# Patient Record
Sex: MALE | Race: WHITE | URBAN - METROPOLITAN AREA
[De-identification: names, ages, dates, MRNs, and addresses within clinical notes are randomized per-mention and may not be internally consistent; named-entity substitution may affect disease eponyms.]

---

## 2019-07-01 ENCOUNTER — NEW PATIENT (OUTPATIENT)
Dept: URBAN - METROPOLITAN AREA CLINIC 26 | Facility: CLINIC | Age: 64
End: 2019-07-01
Payer: MEDICARE

## 2019-07-01 DIAGNOSIS — S05.02XA CORNEAL ABRASION OF LEFT EYE, INITIAL ENCOUNTER: Primary | ICD-10-CM

## 2019-07-01 PROCEDURE — 65430 CORNEAL SMEAR: CPT | Performed by: OPTOMETRIST

## 2019-07-01 PROCEDURE — 92002 INTRM OPH EXAM NEW PATIENT: CPT | Performed by: OPTOMETRIST

## 2019-07-01 RX ORDER — METFORMIN HYDROCHLORIDE 1000 MG/1
TABLET, FILM COATED ORAL
Qty: 0 | Refills: 0 | Status: INACTIVE
Start: 2019-07-01 | End: 2019-12-13

## 2019-07-01 ASSESSMENT — INTRAOCULAR PRESSURE
OD: 16
OS: 21

## 2019-07-09 ENCOUNTER — FOLLOW UP ESTABLISHED (OUTPATIENT)
Dept: URBAN - METROPOLITAN AREA CLINIC 26 | Facility: CLINIC | Age: 64
End: 2019-07-09
Payer: MEDICARE

## 2019-07-09 DIAGNOSIS — S05.02XD CORNEAL ABRASION OF LEFT EYE, SUBSEQUENT ENCOUNTER: Primary | ICD-10-CM

## 2019-07-09 PROCEDURE — 99213 OFFICE O/P EST LOW 20 MIN: CPT | Performed by: OPTOMETRIST

## 2019-07-09 ASSESSMENT — INTRAOCULAR PRESSURE
OD: 10
OS: 21

## 2019-08-13 ENCOUNTER — FOLLOW UP ESTABLISHED (OUTPATIENT)
Dept: URBAN - METROPOLITAN AREA CLINIC 26 | Facility: CLINIC | Age: 64
End: 2019-08-13
Payer: MEDICARE

## 2019-08-13 DIAGNOSIS — E11.3292 DIABETES MELLITUS TYPE 2 WITH MILD NON-PROLIFERATI: ICD-10-CM

## 2019-08-13 DIAGNOSIS — H25.811 COMBINED FORMS OF AGE-RELATED CATARACT, RIGHT EYE: Primary | ICD-10-CM

## 2019-08-13 DIAGNOSIS — H02.831 DERMATOCHALASIS OF RIGHT UPPER LID: ICD-10-CM

## 2019-08-13 PROCEDURE — 92134 CPTRZ OPH DX IMG PST SGM RTA: CPT | Performed by: OPTOMETRIST

## 2019-08-13 PROCEDURE — 92250 FUNDUS PHOTOGRAPHY W/I&R: CPT | Performed by: OPTOMETRIST

## 2019-08-13 PROCEDURE — 92014 COMPRE OPH EXAM EST PT 1/>: CPT | Performed by: OPTOMETRIST

## 2019-08-13 ASSESSMENT — INTRAOCULAR PRESSURE
OD: 12
OS: 16

## 2019-08-13 ASSESSMENT — VISUAL ACUITY
OS: 20/CF 2'
OD: 20/30

## 2019-12-13 ENCOUNTER — Encounter (OUTPATIENT)
Dept: URBAN - METROPOLITAN AREA CLINIC 24 | Facility: CLINIC | Age: 64
End: 2019-12-13
Payer: MEDICARE

## 2019-12-13 DIAGNOSIS — Z01.818 ENCOUNTER FOR OTHER PREPROCEDURAL EXAMINATION: Primary | ICD-10-CM

## 2019-12-13 PROCEDURE — 99213 OFFICE O/P EST LOW 20 MIN: CPT | Performed by: PHYSICIAN ASSISTANT

## 2019-12-13 PROCEDURE — 92025 CPTRIZED CORNEAL TOPOGRAPHY: CPT | Performed by: OPHTHALMOLOGY

## 2019-12-13 RX ORDER — ALLOPURINOL 100 MG/1
100 MG TABLET ORAL
Qty: 0 | Refills: 0 | Status: ACTIVE
Start: 2019-12-13

## 2019-12-13 RX ORDER — METFORMIN HYDROCHLORIDE 500 MG/1
500 MG TABLET, FILM COATED ORAL
Qty: 0 | Refills: 0 | Status: ACTIVE
Start: 2019-12-13

## 2019-12-13 RX ORDER — TEMAZEPAM 7.5 MG/1
7.5 MG CAPSULE ORAL
Qty: 0 | Refills: 0 | Status: ACTIVE
Start: 2019-12-13

## 2019-12-16 ENCOUNTER — NEW PATIENT (OUTPATIENT)
Dept: URBAN - METROPOLITAN AREA CLINIC 24 | Facility: CLINIC | Age: 64
End: 2019-12-16
Payer: MEDICARE

## 2019-12-16 DIAGNOSIS — Z79.84 LONG TERM (CURRENT) USE OF ORAL ANTIDIABETIC DRUGS: ICD-10-CM

## 2019-12-16 DIAGNOSIS — E11.9 TYPE 2 DIABETES MELLITUS WITHOUT COMPLICATIONS: ICD-10-CM

## 2019-12-16 PROCEDURE — 76519 ECHO EXAM OF EYE: CPT | Performed by: OPHTHALMOLOGY

## 2019-12-16 PROCEDURE — 92002 INTRM OPH EXAM NEW PATIENT: CPT | Performed by: OPHTHALMOLOGY

## 2019-12-16 RX ORDER — PREDNISOLONE ACETATE 10 MG/ML
1 % SUSPENSION/ DROPS OPHTHALMIC
Qty: 1 | Refills: 1 | Status: INACTIVE
Start: 2019-12-16 | End: 2020-01-31

## 2019-12-16 RX ORDER — OFLOXACIN 3 MG/ML
0.3 % SOLUTION/ DROPS OPHTHALMIC
Qty: 1 | Refills: 0 | Status: INACTIVE
Start: 2019-12-16 | End: 2020-01-31

## 2019-12-16 RX ORDER — DICLOFENAC SODIUM 1 MG/ML
0.1 % SOLUTION/ DROPS OPHTHALMIC
Qty: 1 | Refills: 1 | Status: INACTIVE
Start: 2019-12-16 | End: 2020-01-31

## 2019-12-16 ASSESSMENT — INTRAOCULAR PRESSURE
OD: 12
OS: 12

## 2019-12-23 ENCOUNTER — POST OP (OUTPATIENT)
Dept: URBAN - METROPOLITAN AREA CLINIC 26 | Facility: CLINIC | Age: 64
End: 2019-12-23

## 2019-12-23 ENCOUNTER — SURGERY (OUTPATIENT)
Dept: URBAN - METROPOLITAN AREA SURGERY 12 | Facility: SURGERY | Age: 64
End: 2019-12-23
Payer: MEDICARE

## 2019-12-23 PROCEDURE — 99024 POSTOP FOLLOW-UP VISIT: CPT | Performed by: OPTOMETRIST

## 2019-12-23 PROCEDURE — 66984 XCAPSL CTRC RMVL W/O ECP: CPT | Performed by: OPHTHALMOLOGY

## 2019-12-23 ASSESSMENT — INTRAOCULAR PRESSURE
OD: 14
OD: 14

## 2020-01-31 ENCOUNTER — POST OP (OUTPATIENT)
Dept: URBAN - METROPOLITAN AREA CLINIC 26 | Facility: CLINIC | Age: 65
End: 2020-01-31
Payer: MEDICARE

## 2020-01-31 DIAGNOSIS — Z96.1 PRESENCE OF INTRAOCULAR LENS: Primary | ICD-10-CM

## 2020-01-31 PROCEDURE — 99024 POSTOP FOLLOW-UP VISIT: CPT | Performed by: OPTOMETRIST

## 2020-01-31 ASSESSMENT — VISUAL ACUITY
OD: 20/20
OS: 20/CF 2'

## 2020-01-31 ASSESSMENT — INTRAOCULAR PRESSURE
OS: 23
OD: 23

## 2020-09-17 ENCOUNTER — OFFICE VISIT (OUTPATIENT)
Dept: URBAN - METROPOLITAN AREA CLINIC 22 | Facility: CLINIC | Age: 65
End: 2020-09-17
Payer: MEDICARE

## 2020-09-17 DIAGNOSIS — H16.142 PUNCTATE KERATITIS, LEFT EYE: ICD-10-CM

## 2020-09-17 PROCEDURE — 92004 COMPRE OPH EXAM NEW PT 1/>: CPT | Performed by: OPTOMETRIST

## 2020-09-17 ASSESSMENT — INTRAOCULAR PRESSURE
OD: 12
OS: 18
OD: 11

## 2020-09-17 NOTE — IMPRESSION/PLAN
Impression: Type 2 diab w mild nonprlf diabetic rtnop w/o macular edema, bilateral: N67.5857. Plan: Patient ed on control of B.S. Continue care with PCP. Hard to get good view with 90 as patient kept moving eye. Was able to get decent view with 20.

## 2021-10-05 ENCOUNTER — OFFICE VISIT (OUTPATIENT)
Dept: URBAN - METROPOLITAN AREA CLINIC 23 | Facility: CLINIC | Age: 66
End: 2021-10-05
Payer: MEDICARE

## 2021-10-05 DIAGNOSIS — E11.3293 TYPE 2 DIAB W MILD NONPRLF DIABETIC RTNOP W/O MACULAR EDEMA, BILATERAL: ICD-10-CM

## 2021-10-05 PROCEDURE — 92014 COMPRE OPH EXAM EST PT 1/>: CPT | Performed by: OPTOMETRIST

## 2021-10-05 RX ORDER — PREDNISOLONE ACETATE 10 MG/ML
1 % SUSPENSION/ DROPS OPHTHALMIC
Qty: 10 | Refills: 0 | Status: INACTIVE
Start: 2021-10-05 | End: 2021-10-05

## 2021-10-05 ASSESSMENT — INTRAOCULAR PRESSURE: OD: 12

## 2021-10-05 NOTE — IMPRESSION/PLAN
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 Plan: pt edu. clouding of cornea os. no notes of clouding in past visits. start pred qid os for 1 mo then taper. consider cornea specialist consult if no improvement.

## 2021-10-05 NOTE — IMPRESSION/PLAN
Impression: Type 2 diab w mild nonprlf diabetic rtnop w/o macular edema, bilateral: U01.5770. Plan: Discussed diagnosis in detail with patient. Advised and emphasized patient of blood sugar control. Discussed risks of progression. Poor compliance can lead to blindness. OCT macula performed and reviewed with patient today. Reassured patient of condition and treatment. Will continue to observe condition and or symptoms.

## 2021-11-08 ENCOUNTER — OFFICE VISIT (OUTPATIENT)
Dept: URBAN - METROPOLITAN AREA CLINIC 23 | Facility: CLINIC | Age: 66
End: 2021-11-08
Payer: MEDICARE

## 2021-11-08 DIAGNOSIS — H18.222 IDIOPATHIC CORNEAL EDEMA, LEFT EYE: Primary | ICD-10-CM

## 2021-11-08 PROCEDURE — 99213 OFFICE O/P EST LOW 20 MIN: CPT | Performed by: OPTOMETRIST

## 2021-11-08 ASSESSMENT — KERATOMETRY: OD: 42.50

## 2021-11-08 NOTE — IMPRESSION/PLAN
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 Plan: Discussed diagnosis in detail with patient. No treatment is required at this time. Reassured patient of current condition. Will continue to observe condition if symptoms worsen or do not resolve. Taper Pred Forte OS 1gtt TID for 1 wk, 1 gtt 2x day for 1 wk, and 1 gtt 1x day for 1 wk. Pt may consider a consult w/ Dr Chavez Vieyra for further treatment options once swelling resolves.

## 2022-03-02 ENCOUNTER — OFFICE VISIT (OUTPATIENT)
Dept: URBAN - METROPOLITAN AREA CLINIC 23 | Facility: CLINIC | Age: 67
End: 2022-03-02
Payer: MEDICARE

## 2022-03-02 DIAGNOSIS — H18.12 BULLOUS KERATOPATHY, LEFT EYE: Primary | ICD-10-CM

## 2022-03-02 PROCEDURE — 99214 OFFICE O/P EST MOD 30 MIN: CPT | Performed by: OPTOMETRIST

## 2022-03-02 RX ORDER — SODIUM CHLORIDE 50 MG/ML
5 % SOLUTION OPHTHALMIC
Qty: 5 | Refills: 2 | Status: ACTIVE
Start: 2022-03-02

## 2022-03-02 RX ORDER — PREDNISOLONE ACETATE 10 MG/ML
1 % SUSPENSION/ DROPS OPHTHALMIC
Qty: 5 | Refills: 2 | Status: ACTIVE
Start: 2022-03-02

## 2022-03-02 NOTE — IMPRESSION/PLAN
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 Condition: will continue to monitor. Plan: Discussed findings and treatment options. Prescribed Prednisolone TID OS and Gerardo 128 OS. Do not recommend BCL due to unknown hygiene habits. Continue to monitor. Recommend consult with corneal specialist for further treatment.

## 2022-04-20 ENCOUNTER — OFFICE VISIT (OUTPATIENT)
Dept: URBAN - METROPOLITAN AREA CLINIC 23 | Facility: CLINIC | Age: 67
End: 2022-04-20
Payer: MEDICARE

## 2022-04-20 DIAGNOSIS — H18.12 BULLOUS KERATOPATHY, LEFT EYE: Primary | ICD-10-CM

## 2022-04-20 PROCEDURE — 99203 OFFICE O/P NEW LOW 30 MIN: CPT | Performed by: OPHTHALMOLOGY

## 2022-04-20 RX ORDER — OFLOXACIN 3 MG/ML
0.3 % SOLUTION/ DROPS OPHTHALMIC
Qty: 5 | Refills: 1 | Status: ACTIVE
Start: 2022-04-20

## 2022-04-20 RX ORDER — PREDNISOLONE ACETATE 10 MG/ML
1 % SUSPENSION/ DROPS OPHTHALMIC
Qty: 10 | Refills: 1 | Status: ACTIVE
Start: 2022-04-20

## 2022-04-20 ASSESSMENT — INTRAOCULAR PRESSURE
OS: 14
OD: 11

## 2022-04-20 NOTE — IMPRESSION/PLAN
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 Condition: quality of life issue. Symptoms: could improve with surgery. Vision: vision affected. Plan: Discussed diagnosis in detail with patient. Discussed risks of progression. Discussed treatment options with patient. Surgical treatment is required. Surgical risks and benefits were discussed, explained and understood by patient. Patient elects to have surgery.  RL-2

## 2022-05-23 ENCOUNTER — SURGERY (OUTPATIENT)
Dept: URBAN - METROPOLITAN AREA SURGERY 11 | Facility: SURGERY | Age: 67
End: 2022-05-23
Payer: MEDICARE

## 2022-05-23 PROCEDURE — 65730 CORNEAL TRANSPLANT: CPT | Performed by: OPHTHALMOLOGY

## 2022-05-24 ENCOUNTER — POST-OPERATIVE VISIT (OUTPATIENT)
Dept: URBAN - METROPOLITAN AREA CLINIC 23 | Facility: CLINIC | Age: 67
End: 2022-05-24
Payer: MEDICARE

## 2022-05-24 DIAGNOSIS — Z48.810 ENCOUNTER FOR SURGICAL AFTERCARE FOLLOWING SURGERY ON A SENSE ORGAN: Primary | ICD-10-CM

## 2022-05-24 PROCEDURE — 99024 POSTOP FOLLOW-UP VISIT: CPT | Performed by: OPTOMETRIST

## 2022-05-24 RX ORDER — PREDNISOLONE ACETATE 10 MG/ML
1 % SUSPENSION/ DROPS OPHTHALMIC
Qty: 10 | Refills: 1 | Status: ACTIVE
Start: 2022-05-24

## 2022-05-24 NOTE — IMPRESSION/PLAN
Impression: S/P Penetrating keratoplasty OS - 1 Day. Encounter for surgical aftercare following surgery on a sense organ  Z48.810. Plan: Pt edu. Appropriate appearance and IOP. Continue Pred Acetate QID OS and start Ofloxacin QID OS. D/c Gerardo OS until follow up. Use AT's PRN OU for comfort. Advised patient to call with any issues. RTC as scheduled.

## 2022-06-01 ENCOUNTER — POST-OPERATIVE VISIT (OUTPATIENT)
Dept: URBAN - METROPOLITAN AREA CLINIC 23 | Facility: CLINIC | Age: 67
End: 2022-06-01
Payer: MEDICARE

## 2022-06-01 DIAGNOSIS — Z48.810 ENCOUNTER FOR SURGICAL AFTERCARE FOLLOWING SURGERY ON A SENSE ORGAN: Primary | ICD-10-CM

## 2022-06-01 PROCEDURE — 99024 POSTOP FOLLOW-UP VISIT: CPT | Performed by: OPHTHALMOLOGY

## 2022-06-01 NOTE — IMPRESSION/PLAN
Impression: S/P Penetrating keratoplasty OS - 9 Days. Encounter for surgical aftercare following surgery on a sense organ  Z48.810. Excellent post op course   Post operative instructions reviewed - Condition is improving - Plan: Ofloxacin and PF QID OS. --Advised patient to use artificial tears for comfort.

## 2022-06-29 ENCOUNTER — POST-OPERATIVE VISIT (OUTPATIENT)
Dept: URBAN - METROPOLITAN AREA CLINIC 23 | Facility: CLINIC | Age: 67
End: 2022-06-29
Payer: MEDICARE

## 2022-06-29 DIAGNOSIS — Z48.810 ENCOUNTER FOR SURGICAL AFTERCARE FOLLOWING SURGERY ON A SENSE ORGAN: Primary | ICD-10-CM

## 2022-06-29 PROCEDURE — 99024 POSTOP FOLLOW-UP VISIT: CPT | Performed by: OPTOMETRIST

## 2022-06-29 NOTE — IMPRESSION/PLAN
Impression: S/P Penetrating keratoplasty OS - 37 Days. Encounter for surgical aftercare following surgery on a sense organ  Z48.810. Excellent post op course   Post operative instructions reviewed - Plan: Pt [facility] on all findings. Discontinue ofloxacin today. Gerardo discontinued 5/24/2022. Taper prednisolone starting today to 3x daily for 1 week then 2x daily for 1 week then qd for 1 week. F/u in 3 weeks to assess improvement to visual acuity. Consider refraction at that time.

## 2022-07-21 ENCOUNTER — POST-OPERATIVE VISIT (OUTPATIENT)
Dept: URBAN - METROPOLITAN AREA CLINIC 23 | Facility: CLINIC | Age: 67
End: 2022-07-21
Payer: MEDICARE

## 2022-07-21 DIAGNOSIS — Z48.810 ENCOUNTER FOR SURGICAL AFTERCARE FOLLOWING SURGERY ON A SENSE ORGAN: Primary | ICD-10-CM

## 2022-07-21 PROCEDURE — 99024 POSTOP FOLLOW-UP VISIT: CPT | Performed by: OPTOMETRIST

## 2022-07-21 ASSESSMENT — INTRAOCULAR PRESSURE
OD: 11
OS: 13

## 2022-07-21 NOTE — IMPRESSION/PLAN
Impression: S/P Penetrating keratoplasty OS - 59 Days. Encounter for surgical aftercare following surgery on a sense organ  Z48.810. Plan: Pt edu. Discussed new srx would not help improve current vision at this time. Discontinue Pred Acetate tomorrow 7-. RTC 2-3 weeks months Dr. Eva Lewis f/u.

## 2022-12-28 ENCOUNTER — POST-OPERATIVE VISIT (OUTPATIENT)
Dept: URBAN - METROPOLITAN AREA CLINIC 23 | Facility: CLINIC | Age: 67
End: 2022-12-28
Payer: MEDICARE

## 2022-12-28 DIAGNOSIS — Z48.810 ENCOUNTER FOR SURGICAL AFTERCARE FOLLOWING SURGERY ON A SENSE ORGAN: Primary | ICD-10-CM

## 2022-12-28 PROCEDURE — 99024 POSTOP FOLLOW-UP VISIT: CPT | Performed by: OPHTHALMOLOGY

## 2022-12-28 ASSESSMENT — INTRAOCULAR PRESSURE
OD: 14
OS: 15

## 2022-12-28 NOTE — IMPRESSION/PLAN
Impression: S/P Penetrating keratoplasty OS - 219 Days. Encounter for surgical aftercare following surgery on a sense organ  Z48.810. Excellent post op course   Post operative instructions reviewed - Condition is improving - Plan: OK to stay off Prednisolone Acetate, artificial tears as needed.